# Patient Record
Sex: MALE | Race: BLACK OR AFRICAN AMERICAN | NOT HISPANIC OR LATINO | URBAN - METROPOLITAN AREA
[De-identification: names, ages, dates, MRNs, and addresses within clinical notes are randomized per-mention and may not be internally consistent; named-entity substitution may affect disease eponyms.]

---

## 2020-04-01 ENCOUNTER — EMERGENCY (EMERGENCY)
Facility: HOSPITAL | Age: 75
LOS: 1 days | Discharge: HOME | End: 2020-04-01
Attending: EMERGENCY MEDICINE | Admitting: EMERGENCY MEDICINE
Payer: COMMERCIAL

## 2020-04-01 VITALS
OXYGEN SATURATION: 96 % | HEART RATE: 109 BPM | DIASTOLIC BLOOD PRESSURE: 85 MMHG | SYSTOLIC BLOOD PRESSURE: 118 MMHG | TEMPERATURE: 98 F | RESPIRATION RATE: 16 BRPM

## 2020-04-01 VITALS
TEMPERATURE: 99 F | SYSTOLIC BLOOD PRESSURE: 116 MMHG | RESPIRATION RATE: 18 BRPM | DIASTOLIC BLOOD PRESSURE: 72 MMHG | OXYGEN SATURATION: 96 % | HEART RATE: 116 BPM

## 2020-04-01 PROCEDURE — 99284 EMERGENCY DEPT VISIT MOD MDM: CPT | Mod: 25

## 2020-04-01 PROCEDURE — 93010 ELECTROCARDIOGRAM REPORT: CPT

## 2020-04-01 NOTE — ED PROVIDER NOTE - NS ED ROS FT
Please see HPI section of chart for further detailed Review of Systems    chest pain  nausea  lightheadedness

## 2020-04-01 NOTE — ED PROVIDER NOTE - CLINICAL SUMMARY MEDICAL DECISION MAKING FREE TEXT BOX
75M, hx CAD/Stent, HLD presenting s/p episode midsternal stabbing chest pain w nausea, lightheadedness. Now feeling improved. EKG w/o stemi. Tachycardic. No infectious sx. Will obtain CXR, labs including trop. Currently stable, no acute distress. Will continue to follow up and re-assess. Case discussed with Attending.  Gunner Hilliard MD, PGY3 Emergency Medicine

## 2020-04-01 NOTE — ED ADULT TRIAGE NOTE - CHIEF COMPLAINT QUOTE
Pt st" I have chest pain and short of breath....started at 8pm. I have stents." Contact Daiana Alicia Wife [phone]

## 2020-04-01 NOTE — ED PROVIDER NOTE - OBJECTIVE STATEMENT
75M, hx CAD s/p stent (2010), HLD presents w chief complaint of chest pain. Patient reports an episode of midsternal stabbing chest pain around 9pm. Episode lasted ~5 minutes. Associated w nausea, lightheadedness. Pain was at rest, non exertional, non positional, non radiating. NO fevers or chills, vomiting, cough, shortness of breath, abdominal pain, leg swelling, calf pain, diarrhea, numbness or weakness to extremities, pain to back/arm/jaw. Patient reprots another brief episode prior to arrival to main ED. Currently resting w/o symptoms.   No allergies. No tobacco, ethanol, or drug use. Takes asa81 daily, took one this AM.   Cardiologist at [facility]

## 2020-04-01 NOTE — ED ADULT NURSE NOTE - CHIEF COMPLAINT QUOTE
Pt st" I have chest pain and short of breath....started at 8pm. I have stents." Contact Daiana Alicia Wife [phone]

## 2020-04-01 NOTE — ED ADULT NURSE NOTE - OBJECTIVE STATEMENT
Patient is a 75-year-old male A&OX4, ambulatory, came in complaining of chest pain. Patient with a history of a stent (2010) and HTN. Patient says the CP has subsided at this time. Patient placed on the cardiac monitor in hallway spot 22A. Patient's VSS. A 20 G placed in R AC. Labs drawn and sent. EKG done. Will continue to monitor.

## 2020-04-01 NOTE — ED PROVIDER NOTE - ATTENDING CONTRIBUTION TO CARE
75 year old with intemittent cp. none at this time. ekg unchanged. concern for acs vs pna vs msk pain vs pericarditis as pain is positional. trops ekg labs cxr reassess. likely dc

## 2020-04-01 NOTE — ED PROVIDER NOTE - PHYSICAL EXAMINATION
General: Well appearing, awake, alert, oriented, no acute distress. Resting in bed.  HEENT: PERRLA EOMI. No trauma/bruising noted to head or face. No rhinorrhea noted.   CV: tachy rate and reg rhythm, S1/S2, no murmurs/rubs/gallops noted on exam. No tenderness to palpation to chest wall.   Lungs: Clear to ascultation bilaterally, no wheezes/crackles/rales noted on exam. Equal chest wall excursion noted.   Abdomen: Soft, non tender, non distended, no guarding or rebound. No CVA tenderness to palpation.   MSK: Intact ROM of upper and lower extremities bilaterally. Intact ROM of neck. No gross deformities noted to extremities.   Neuro: Awake, A+O x4, moving all extremities spontaneously. CN 2-12 grossly intact. No nystagmus noted. Strength grossly intact to all extremities. Speech fluent, no slurred speech.   Extremities: No swelling or edema noted to extremities. No calf tenderness to palpation.   Skin: No rash onoted on exam.

## 2020-04-01 NOTE — ED PROVIDER NOTE - PATIENT PORTAL LINK FT
You can access the FollowMyHealth Patient Portal offered by [facility] by registering at the following website: [email or web address]. By joining [employer]’s FollowMyHealth portal, you will also be able to view your health information using other applications (apps) compatible with our system.

## 2020-04-01 NOTE — ED PROVIDER NOTE - NSFOLLOWUPINSTRUCTIONS_ED_ALL_ED_FT
Please follow up with your primary care physician for further care and evaluation.  If testing was performed in the Emergency Department, please bring copies of all test results to your doctor.  Please continue to take all home medications as previously prescribed.    PLEASE CALL YOUR CARDIOLOGIST TOMORROW TO DISCUSS FOLLOW UP CARE    Return to hospital for any new or concerning symptoms, including but not limited to: fevers, chills, nausea, vomiting, headache, dizziness, lightheadedness, worsening or recurrent chest pain, shortness of breath, difficulty breathing, abdominal pain, weakness, or any other new or concerning symptoms.

## 2020-04-01 NOTE — ED PROVIDER NOTE - PROGRESS NOTE DETAILS
Patient resting in bed, no acute distress, currently asymptomatic. Initial and rpt trop 10. EKG sinus w LBBB, no STEMI noted. Spoke w patient extensively regarding current differential. Given current pandemic state, unable to keep in CDU overnight for stresst test tomorrow. Spoke w patient regarding admit vs d/c home, and patient in agreement that d/c home is preferred at this time with close f/u. Will d/c home w instructions to f/u with his cardiologist within the week, and given strict return precautions. Patient in agreement w/ plan for d/c home and plan for follow up.   Gunner Hilliard MD, PGY3 Emergency Medicine Patient noted to be tachy to 105-108 at time of d/c, but with normal O2/BP. Patient denies any current sx. Extensive discussion w patient who is amenable for d/c and given very strict return precautions as well as screening info regarding sx of COVID and warning signs for return to ED  Gunner Hilliard MD, PGY3 Emergency Medicine

## 2020-04-02 LAB
ALBUMIN SERPL ELPH-MCNC: 3.6 G/DL — SIGNIFICANT CHANGE UP (ref 3.3–5)
ALP SERPL-CCNC: 59 U/L — SIGNIFICANT CHANGE UP (ref 40–120)
ALT FLD-CCNC: 35 U/L — SIGNIFICANT CHANGE UP (ref 4–41)
ANION GAP SERPL CALC-SCNC: 14 MMO/L — SIGNIFICANT CHANGE UP (ref 7–14)
APTT BLD: 30.3 SEC — SIGNIFICANT CHANGE UP (ref 27.5–36.3)
AST SERPL-CCNC: 47 U/L — HIGH (ref 4–40)
BILIRUB SERPL-MCNC: 0.5 MG/DL — SIGNIFICANT CHANGE UP (ref 0.2–1.2)
BUN SERPL-MCNC: 23 MG/DL — SIGNIFICANT CHANGE UP (ref 7–23)
CALCIUM SERPL-MCNC: 9 MG/DL — SIGNIFICANT CHANGE UP (ref 8.4–10.5)
CHLORIDE SERPL-SCNC: 94 MMOL/L — LOW (ref 98–107)
CO2 SERPL-SCNC: 21 MMOL/L — LOW (ref 22–31)
CREAT SERPL-MCNC: 1.59 MG/DL — HIGH (ref 0.5–1.3)
GLUCOSE SERPL-MCNC: 128 MG/DL — HIGH (ref 70–99)
HCT VFR BLD CALC: 38 % — LOW (ref 39–50)
HGB BLD-MCNC: 13.1 G/DL — SIGNIFICANT CHANGE UP (ref 13–17)
INR BLD: 1.19 — HIGH (ref 0.88–1.17)
MCHC RBC-ENTMCNC: 30.3 PG — SIGNIFICANT CHANGE UP (ref 27–34)
MCHC RBC-ENTMCNC: 34.5 % — SIGNIFICANT CHANGE UP (ref 32–36)
MCV RBC AUTO: 88 FL — SIGNIFICANT CHANGE UP (ref 80–100)
NRBC # FLD: 0 K/UL — SIGNIFICANT CHANGE UP (ref 0–0)
PLATELET # BLD AUTO: 202 K/UL — SIGNIFICANT CHANGE UP (ref 150–400)
PMV BLD: 10 FL — SIGNIFICANT CHANGE UP (ref 7–13)
POTASSIUM SERPL-MCNC: 4.1 MMOL/L — SIGNIFICANT CHANGE UP (ref 3.5–5.3)
POTASSIUM SERPL-SCNC: 4.1 MMOL/L — SIGNIFICANT CHANGE UP (ref 3.5–5.3)
PROT SERPL-MCNC: 7.7 G/DL — SIGNIFICANT CHANGE UP (ref 6–8.3)
PROTHROM AB SERPL-ACNC: 13.8 SEC — HIGH (ref 9.8–13.1)
RBC # BLD: 4.32 M/UL — SIGNIFICANT CHANGE UP (ref 4.2–5.8)
RBC # FLD: 14.4 % — SIGNIFICANT CHANGE UP (ref 10.3–14.5)
SODIUM SERPL-SCNC: 129 MMOL/L — LOW (ref 135–145)
TROPONIN T, HIGH SENSITIVITY: 10 NG/L — SIGNIFICANT CHANGE UP (ref ?–14)
TROPONIN T, HIGH SENSITIVITY: 10 NG/L — SIGNIFICANT CHANGE UP (ref ?–14)
WBC # BLD: 13.55 K/UL — HIGH (ref 3.8–10.5)
WBC # FLD AUTO: 13.55 K/UL — HIGH (ref 3.8–10.5)

## 2020-04-02 PROCEDURE — 71045 X-RAY EXAM CHEST 1 VIEW: CPT | Mod: 26

## 2022-09-18 ENCOUNTER — EMERGENCY (EMERGENCY)
Facility: HOSPITAL | Age: 77
LOS: 1 days | Discharge: HOME | End: 2022-09-18
Attending: EMERGENCY MEDICINE | Admitting: EMERGENCY MEDICINE
Payer: COMMERCIAL

## 2022-09-18 VITALS
RESPIRATION RATE: 15 BRPM | DIASTOLIC BLOOD PRESSURE: 69 MMHG | OXYGEN SATURATION: 100 % | SYSTOLIC BLOOD PRESSURE: 121 MMHG | HEART RATE: 66 BPM

## 2022-09-18 VITALS
DIASTOLIC BLOOD PRESSURE: 73 MMHG | TEMPERATURE: 98 F | HEIGHT: 71 IN | RESPIRATION RATE: 18 BRPM | SYSTOLIC BLOOD PRESSURE: 134 MMHG | HEART RATE: 81 BPM | OXYGEN SATURATION: 97 %

## 2022-09-18 LAB
ALBUMIN SERPL ELPH-MCNC: 4.1 G/DL — SIGNIFICANT CHANGE UP (ref 3.3–5)
ALP SERPL-CCNC: 95 U/L — SIGNIFICANT CHANGE UP (ref 40–120)
ALT FLD-CCNC: 14 U/L — SIGNIFICANT CHANGE UP (ref 4–41)
ANION GAP SERPL CALC-SCNC: 7 MMOL/L — SIGNIFICANT CHANGE UP (ref 7–14)
AST SERPL-CCNC: 20 U/L — SIGNIFICANT CHANGE UP (ref 4–40)
BASOPHILS # BLD AUTO: 0.03 K/UL — SIGNIFICANT CHANGE UP (ref 0–0.2)
BASOPHILS NFR BLD AUTO: 0.5 % — SIGNIFICANT CHANGE UP (ref 0–2)
BILIRUB SERPL-MCNC: 0.2 MG/DL — SIGNIFICANT CHANGE UP (ref 0.2–1.2)
BUN SERPL-MCNC: 22 MG/DL — SIGNIFICANT CHANGE UP (ref 7–23)
CALCIUM SERPL-MCNC: 9.1 MG/DL — SIGNIFICANT CHANGE UP (ref 8.4–10.5)
CHLORIDE SERPL-SCNC: 109 MMOL/L — HIGH (ref 98–107)
CO2 SERPL-SCNC: 25 MMOL/L — SIGNIFICANT CHANGE UP (ref 22–31)
CREAT SERPL-MCNC: 1.42 MG/DL — HIGH (ref 0.5–1.3)
EGFR: 52 ML/MIN/1.73M2 — LOW
EOSINOPHIL # BLD AUTO: 0.19 K/UL — SIGNIFICANT CHANGE UP (ref 0–0.5)
EOSINOPHIL NFR BLD AUTO: 3.3 % — SIGNIFICANT CHANGE UP (ref 0–6)
GLUCOSE SERPL-MCNC: 97 MG/DL — SIGNIFICANT CHANGE UP (ref 70–99)
HCT VFR BLD CALC: 36.9 % — LOW (ref 39–50)
HGB BLD-MCNC: 12.4 G/DL — LOW (ref 13–17)
IANC: 2.46 K/UL — SIGNIFICANT CHANGE UP (ref 1.8–7.4)
IMM GRANULOCYTES NFR BLD AUTO: 0.2 % — SIGNIFICANT CHANGE UP (ref 0–0.9)
LYMPHOCYTES # BLD AUTO: 2.29 K/UL — SIGNIFICANT CHANGE UP (ref 1–3.3)
LYMPHOCYTES # BLD AUTO: 39.6 % — SIGNIFICANT CHANGE UP (ref 13–44)
MCHC RBC-ENTMCNC: 31.1 PG — SIGNIFICANT CHANGE UP (ref 27–34)
MCHC RBC-ENTMCNC: 33.6 GM/DL — SIGNIFICANT CHANGE UP (ref 32–36)
MCV RBC AUTO: 92.5 FL — SIGNIFICANT CHANGE UP (ref 80–100)
MONOCYTES # BLD AUTO: 0.8 K/UL — SIGNIFICANT CHANGE UP (ref 0–0.9)
MONOCYTES NFR BLD AUTO: 13.8 % — SIGNIFICANT CHANGE UP (ref 2–14)
NEUTROPHILS # BLD AUTO: 2.46 K/UL — SIGNIFICANT CHANGE UP (ref 1.8–7.4)
NEUTROPHILS NFR BLD AUTO: 42.6 % — LOW (ref 43–77)
NRBC # BLD: 0 /100 WBCS — SIGNIFICANT CHANGE UP (ref 0–0)
NRBC # FLD: 0 K/UL — SIGNIFICANT CHANGE UP (ref 0–0)
PLATELET # BLD AUTO: 181 K/UL — SIGNIFICANT CHANGE UP (ref 150–400)
POTASSIUM SERPL-MCNC: 4.2 MMOL/L — SIGNIFICANT CHANGE UP (ref 3.5–5.3)
POTASSIUM SERPL-SCNC: 4.2 MMOL/L — SIGNIFICANT CHANGE UP (ref 3.5–5.3)
PROT SERPL-MCNC: 7.5 G/DL — SIGNIFICANT CHANGE UP (ref 6–8.3)
RBC # BLD: 3.99 M/UL — LOW (ref 4.2–5.8)
RBC # FLD: 14.4 % — SIGNIFICANT CHANGE UP (ref 10.3–14.5)
SODIUM SERPL-SCNC: 141 MMOL/L — SIGNIFICANT CHANGE UP (ref 135–145)
TROPONIN T, HIGH SENSITIVITY RESULT: <6 NG/L — SIGNIFICANT CHANGE UP
TROPONIN T, HIGH SENSITIVITY RESULT: <6 NG/L — SIGNIFICANT CHANGE UP
WBC # BLD: 5.78 K/UL — SIGNIFICANT CHANGE UP (ref 3.8–10.5)
WBC # FLD AUTO: 5.78 K/UL — SIGNIFICANT CHANGE UP (ref 3.8–10.5)

## 2022-09-18 PROCEDURE — 93010 ELECTROCARDIOGRAM REPORT: CPT

## 2022-09-18 PROCEDURE — 99285 EMERGENCY DEPT VISIT HI MDM: CPT | Mod: 25

## 2022-09-18 PROCEDURE — 71045 X-RAY EXAM CHEST 1 VIEW: CPT | Mod: 26

## 2022-09-18 RX ORDER — IBUPROFEN 200 MG
400 TABLET ORAL ONCE
Refills: 0 | Status: COMPLETED | OUTPATIENT
Start: 2022-09-18 | End: 2022-09-18

## 2022-09-18 RX ORDER — ACETAMINOPHEN 500 MG
975 TABLET ORAL ONCE
Refills: 0 | Status: COMPLETED | OUTPATIENT
Start: 2022-09-18 | End: 2022-09-18

## 2022-09-18 RX ORDER — ASPIRIN/CALCIUM CARB/MAGNESIUM 324 MG
324 TABLET ORAL ONCE
Refills: 0 | Status: COMPLETED | OUTPATIENT
Start: 2022-09-18 | End: 2022-09-18

## 2022-09-18 RX ADMIN — Medication 324 MILLIGRAM(S): at 19:49

## 2022-09-18 RX ADMIN — Medication 975 MILLIGRAM(S): at 18:29

## 2022-09-18 RX ADMIN — Medication 400 MILLIGRAM(S): at 18:29

## 2022-09-18 NOTE — ED PROVIDER NOTE - NSICDXPASTMEDICALHX_GEN_ALL_CORE_FT
PAST MEDICAL HISTORY:  CAD (coronary artery disease) one cardiac stent x 1 2010    Hypertension

## 2022-09-18 NOTE — ED PROVIDER NOTE - PATIENT PORTAL LINK FT
You can access the FollowMyHealth Patient Portal offered by [facility] by registering at the following website: [email or web address]. By joining [employer]’s FollowMyHealth portal, you will also be able to view your health information using other applications (apps) compatible with our system.

## 2022-09-18 NOTE — ED PROVIDER NOTE - OBJECTIVE STATEMENT
77M w/ h/o CAD s/p stent x1, HTN, HLD, who presents with 2 hours of L chest pain. Patient was lifting heavy grocery bags earlier today, Left chest pain began 1 hour afterwards, non-radiating, sharp, exacerbated by moving left arm. Denies SOB, fever/chills, abd pain, N/V.

## 2022-09-18 NOTE — ED PROVIDER NOTE - ATTENDING CONTRIBUTION TO CARE
I performed a face to face history and physical exam of the patient and discussed their management with the resident. I reviewed the resident's note and agree with the documented findings and plan of care.     ANTON: 76 y/o male with hx of CAD s/p stent in 2010, HTN, HLD, p/w substernal chest pain started 4 pm today, 1 hour after lifting something heavy, episode lasting 15 minutes x 2, chest pain free here, no SOB, no N/V, no diaphoresis, no radiation of pain, no leg pain, no leg swelling, on exam no chest wall tenderness, pain when he lifts arm up, abdomen soft NT/ND, ext no C/C/E, will tele, cxr, ekg, cbc, cmp, ce, reevaluate

## 2022-09-18 NOTE — ED PROVIDER NOTE - PROGRESS NOTE DETAILS
JPATEL: pt reassessed, resting comfortably in stretcher, no episodes of CP while in ED [facility] PGY-3: Patient reassessed, resting comfortably, denies having any chest pain. Offered CDU stay for further cardiac work up but pt prefers to go home, will f/u with cardiologist Dr. Martell tomorrow AM. Return precautions given. Will dc

## 2022-09-18 NOTE — ED PROVIDER NOTE - PHYSICAL EXAMINATION
GENERAL: well appearing in no acute distress, non-toxic appearing  CARDIAC: regular rate and rhythm, normal S1S2, no appreciable murmurs, 2+ pulses in UE/LE b/l  PULM: normal breath sounds, clear to ascultation bilaterally, no rales, rhonchi, wheezing  NEURO: normal speech, AAOx3  MSK: ROM intact, + LSB and L pec tenderness   SKIN: well-perfused, extremities warm, no visible rashes  PSYCH: appropriate mood and affect

## 2022-09-18 NOTE — ED PROVIDER NOTE - CLINICAL SUMMARY MEDICAL DECISION MAKING FREE TEXT BOX
77M w/ h/o CAD s/p stent x1, HTN, HLD, who presents with 2 hours of L chest pain. Patient was lifting heavy grocery bags earlier today, Left chest pain began 1 hour afterwards, non-radiating, sharp, exacerbated by moving left arm. Denies SOB, fever/chills, abd pain, N/V. AVSS, RRR, no murmurs, lungs CTA b/l, + L ant chest wall tenderness. MSK vs less likely fracture vs less likely ACS. Will get labs, ECG, CXR, give meds and reassess.

## 2022-09-18 NOTE — ED ADULT NURSE NOTE - OBJECTIVE STATEMENT
Break coverage RN- Received pt in room 7. pt A&OX3, ambulatory, family at bedside. pt with hx of 1 stent in 2010. pt c/o midsternal "sharp" chest pain beginning after lifting heavy shopping bags today. pt appears to be in no distress. placed on cardiac monitor. respirations are equal and nonlabored, no respiratory distress noted, sating 100% on room air. denies any pain at this time, no sob, cough, fever/chills, headache, dizziness, n/v. 20 gauge iv placed in the left ac, labs sent. pt medicated as per orders. safety maintained, side rails up. awaiting further plan will reassess & monitor.

## 2022-09-18 NOTE — ED ADULT TRIAGE NOTE - CHIEF COMPLAINT QUOTE
Pt AOX4 c/o chest pain, sharp and midsternal,  started after a bout of lifting heavy shopping bags, denies SOB, pt has Hx of 1 stent placed 2010; was on Plavix for 10 years - was discontinued in 2020

## 2022-09-18 NOTE — ED ADULT NURSE NOTE - NSIMPLEMENTINTERV_GEN_ALL_ED
Implemented All Universal Safety Interventions:  [place] to call system. Call bell, personal items and telephone within reach. Instruct patient to call for assistance. Room bathroom lighting operational. Non-slip footwear when patient is off stretcher. Physically safe environment: no spills, clutter or unnecessary equipment. Stretcher in lowest position, wheels locked, appropriate side rails in place.

## 2023-10-16 NOTE — ED ADULT NURSE NOTE - NS ED NURSE RECORD ANOTHER VITAL SIGN
I called [facility] in response to a referral that was received for patient to establish care with Thoracic Surgery. Outreach was made to complete patient's intake questionnaire . Patient's intake questionnaire was reviewed and complete. Patient's intake has been sent to the team for clinical review.
Yes

## 2023-11-01 ENCOUNTER — EMERGENCY (EMERGENCY)
Facility: HOSPITAL | Age: 78
LOS: 1 days | Discharge: HOME | End: 2023-11-01
Attending: STUDENT IN AN ORGANIZED HEALTH CARE EDUCATION/TRAINING PROGRAM | Admitting: STUDENT IN AN ORGANIZED HEALTH CARE EDUCATION/TRAINING PROGRAM
Payer: COMMERCIAL

## 2023-11-01 VITALS
TEMPERATURE: 98 F | DIASTOLIC BLOOD PRESSURE: 86 MMHG | RESPIRATION RATE: 16 BRPM | OXYGEN SATURATION: 100 % | HEART RATE: 62 BPM | SYSTOLIC BLOOD PRESSURE: 153 MMHG

## 2023-11-01 LAB
ALBUMIN SERPL ELPH-MCNC: 3.7 G/DL — SIGNIFICANT CHANGE UP (ref 3.3–5)
ALBUMIN SERPL ELPH-MCNC: 3.7 G/DL — SIGNIFICANT CHANGE UP (ref 3.3–5)
ALP SERPL-CCNC: 83 U/L — SIGNIFICANT CHANGE UP (ref 40–120)
ALP SERPL-CCNC: 83 U/L — SIGNIFICANT CHANGE UP (ref 40–120)
ALT FLD-CCNC: 16 U/L — SIGNIFICANT CHANGE UP (ref 4–41)
ALT FLD-CCNC: 16 U/L — SIGNIFICANT CHANGE UP (ref 4–41)
ANION GAP SERPL CALC-SCNC: 9 MMOL/L — SIGNIFICANT CHANGE UP (ref 7–14)
ANION GAP SERPL CALC-SCNC: 9 MMOL/L — SIGNIFICANT CHANGE UP (ref 7–14)
APPEARANCE UR: ABNORMAL
APPEARANCE UR: ABNORMAL
AST SERPL-CCNC: 20 U/L — SIGNIFICANT CHANGE UP (ref 4–40)
AST SERPL-CCNC: 20 U/L — SIGNIFICANT CHANGE UP (ref 4–40)
BACTERIA # UR AUTO: NEGATIVE /HPF — SIGNIFICANT CHANGE UP
BACTERIA # UR AUTO: NEGATIVE /HPF — SIGNIFICANT CHANGE UP
BASOPHILS # BLD AUTO: 0.03 K/UL — SIGNIFICANT CHANGE UP (ref 0–0.2)
BASOPHILS # BLD AUTO: 0.03 K/UL — SIGNIFICANT CHANGE UP (ref 0–0.2)
BASOPHILS NFR BLD AUTO: 0.6 % — SIGNIFICANT CHANGE UP (ref 0–2)
BASOPHILS NFR BLD AUTO: 0.6 % — SIGNIFICANT CHANGE UP (ref 0–2)
BILIRUB SERPL-MCNC: 0.3 MG/DL — SIGNIFICANT CHANGE UP (ref 0.2–1.2)
BILIRUB SERPL-MCNC: 0.3 MG/DL — SIGNIFICANT CHANGE UP (ref 0.2–1.2)
BILIRUB UR-MCNC: NEGATIVE — SIGNIFICANT CHANGE UP
BILIRUB UR-MCNC: NEGATIVE — SIGNIFICANT CHANGE UP
BUN SERPL-MCNC: 20 MG/DL — SIGNIFICANT CHANGE UP (ref 7–23)
BUN SERPL-MCNC: 20 MG/DL — SIGNIFICANT CHANGE UP (ref 7–23)
CALCIUM SERPL-MCNC: 8.4 MG/DL — SIGNIFICANT CHANGE UP (ref 8.4–10.5)
CALCIUM SERPL-MCNC: 8.4 MG/DL — SIGNIFICANT CHANGE UP (ref 8.4–10.5)
CAST: 0 /LPF — SIGNIFICANT CHANGE UP (ref 0–4)
CAST: 0 /LPF — SIGNIFICANT CHANGE UP (ref 0–4)
CHLORIDE SERPL-SCNC: 108 MMOL/L — HIGH (ref 98–107)
CHLORIDE SERPL-SCNC: 108 MMOL/L — HIGH (ref 98–107)
CO2 SERPL-SCNC: 24 MMOL/L — SIGNIFICANT CHANGE UP (ref 22–31)
CO2 SERPL-SCNC: 24 MMOL/L — SIGNIFICANT CHANGE UP (ref 22–31)
COLOR SPEC: ABNORMAL
COLOR SPEC: ABNORMAL
CREAT SERPL-MCNC: 1.28 MG/DL — SIGNIFICANT CHANGE UP (ref 0.5–1.3)
CREAT SERPL-MCNC: 1.28 MG/DL — SIGNIFICANT CHANGE UP (ref 0.5–1.3)
DIFF PNL FLD: ABNORMAL
DIFF PNL FLD: ABNORMAL
EGFR: 58 ML/MIN/1.73M2 — LOW
EGFR: 58 ML/MIN/1.73M2 — LOW
EOSINOPHIL # BLD AUTO: 0.24 K/UL — SIGNIFICANT CHANGE UP (ref 0–0.5)
EOSINOPHIL # BLD AUTO: 0.24 K/UL — SIGNIFICANT CHANGE UP (ref 0–0.5)
EOSINOPHIL NFR BLD AUTO: 4.4 % — SIGNIFICANT CHANGE UP (ref 0–6)
EOSINOPHIL NFR BLD AUTO: 4.4 % — SIGNIFICANT CHANGE UP (ref 0–6)
GLUCOSE SERPL-MCNC: 94 MG/DL — SIGNIFICANT CHANGE UP (ref 70–99)
GLUCOSE SERPL-MCNC: 94 MG/DL — SIGNIFICANT CHANGE UP (ref 70–99)
GLUCOSE UR QL: NEGATIVE MG/DL — SIGNIFICANT CHANGE UP
GLUCOSE UR QL: NEGATIVE MG/DL — SIGNIFICANT CHANGE UP
HCT VFR BLD CALC: 33.1 % — LOW (ref 39–50)
HCT VFR BLD CALC: 33.1 % — LOW (ref 39–50)
HGB BLD-MCNC: 11.1 G/DL — LOW (ref 13–17)
HGB BLD-MCNC: 11.1 G/DL — LOW (ref 13–17)
IANC: 1.66 K/UL — LOW (ref 1.8–7.4)
IANC: 1.66 K/UL — LOW (ref 1.8–7.4)
IMM GRANULOCYTES NFR BLD AUTO: 0.2 % — SIGNIFICANT CHANGE UP (ref 0–0.9)
IMM GRANULOCYTES NFR BLD AUTO: 0.2 % — SIGNIFICANT CHANGE UP (ref 0–0.9)
KETONES UR-MCNC: NEGATIVE MG/DL — SIGNIFICANT CHANGE UP
KETONES UR-MCNC: NEGATIVE MG/DL — SIGNIFICANT CHANGE UP
LEUKOCYTE ESTERASE UR-ACNC: ABNORMAL
LEUKOCYTE ESTERASE UR-ACNC: ABNORMAL
LIDOCAIN IGE QN: 48 U/L — SIGNIFICANT CHANGE UP (ref 7–60)
LIDOCAIN IGE QN: 48 U/L — SIGNIFICANT CHANGE UP (ref 7–60)
LYMPHOCYTES # BLD AUTO: 2.7 K/UL — SIGNIFICANT CHANGE UP (ref 1–3.3)
LYMPHOCYTES # BLD AUTO: 2.7 K/UL — SIGNIFICANT CHANGE UP (ref 1–3.3)
LYMPHOCYTES # BLD AUTO: 50 % — HIGH (ref 13–44)
LYMPHOCYTES # BLD AUTO: 50 % — HIGH (ref 13–44)
MCHC RBC-ENTMCNC: 30.7 PG — SIGNIFICANT CHANGE UP (ref 27–34)
MCHC RBC-ENTMCNC: 30.7 PG — SIGNIFICANT CHANGE UP (ref 27–34)
MCHC RBC-ENTMCNC: 33.5 GM/DL — SIGNIFICANT CHANGE UP (ref 32–36)
MCHC RBC-ENTMCNC: 33.5 GM/DL — SIGNIFICANT CHANGE UP (ref 32–36)
MCV RBC AUTO: 91.7 FL — SIGNIFICANT CHANGE UP (ref 80–100)
MCV RBC AUTO: 91.7 FL — SIGNIFICANT CHANGE UP (ref 80–100)
MONOCYTES # BLD AUTO: 0.76 K/UL — SIGNIFICANT CHANGE UP (ref 0–0.9)
MONOCYTES # BLD AUTO: 0.76 K/UL — SIGNIFICANT CHANGE UP (ref 0–0.9)
MONOCYTES NFR BLD AUTO: 14.1 % — HIGH (ref 2–14)
MONOCYTES NFR BLD AUTO: 14.1 % — HIGH (ref 2–14)
NEUTROPHILS # BLD AUTO: 1.66 K/UL — LOW (ref 1.8–7.4)
NEUTROPHILS # BLD AUTO: 1.66 K/UL — LOW (ref 1.8–7.4)
NEUTROPHILS NFR BLD AUTO: 30.7 % — LOW (ref 43–77)
NEUTROPHILS NFR BLD AUTO: 30.7 % — LOW (ref 43–77)
NITRITE UR-MCNC: NEGATIVE — SIGNIFICANT CHANGE UP
NITRITE UR-MCNC: NEGATIVE — SIGNIFICANT CHANGE UP
NRBC # BLD: 0 /100 WBCS — SIGNIFICANT CHANGE UP (ref 0–0)
NRBC # BLD: 0 /100 WBCS — SIGNIFICANT CHANGE UP (ref 0–0)
NRBC # FLD: 0 K/UL — SIGNIFICANT CHANGE UP (ref 0–0)
NRBC # FLD: 0 K/UL — SIGNIFICANT CHANGE UP (ref 0–0)
PH UR: 6.5 — SIGNIFICANT CHANGE UP (ref 5–8)
PH UR: 6.5 — SIGNIFICANT CHANGE UP (ref 5–8)
PLATELET # BLD AUTO: 162 K/UL — SIGNIFICANT CHANGE UP (ref 150–400)
PLATELET # BLD AUTO: 162 K/UL — SIGNIFICANT CHANGE UP (ref 150–400)
POTASSIUM SERPL-MCNC: 4.2 MMOL/L — SIGNIFICANT CHANGE UP (ref 3.5–5.3)
POTASSIUM SERPL-MCNC: 4.2 MMOL/L — SIGNIFICANT CHANGE UP (ref 3.5–5.3)
POTASSIUM SERPL-SCNC: 4.2 MMOL/L — SIGNIFICANT CHANGE UP (ref 3.5–5.3)
POTASSIUM SERPL-SCNC: 4.2 MMOL/L — SIGNIFICANT CHANGE UP (ref 3.5–5.3)
PROT SERPL-MCNC: 6.7 G/DL — SIGNIFICANT CHANGE UP (ref 6–8.3)
PROT SERPL-MCNC: 6.7 G/DL — SIGNIFICANT CHANGE UP (ref 6–8.3)
PROT UR-MCNC: SIGNIFICANT CHANGE UP MG/DL
PROT UR-MCNC: SIGNIFICANT CHANGE UP MG/DL
RBC # BLD: 3.61 M/UL — LOW (ref 4.2–5.8)
RBC # BLD: 3.61 M/UL — LOW (ref 4.2–5.8)
RBC # FLD: 14.8 % — HIGH (ref 10.3–14.5)
RBC # FLD: 14.8 % — HIGH (ref 10.3–14.5)
RBC CASTS # UR COMP ASSIST: 820 /HPF — HIGH (ref 0–4)
RBC CASTS # UR COMP ASSIST: 820 /HPF — HIGH (ref 0–4)
SODIUM SERPL-SCNC: 141 MMOL/L — SIGNIFICANT CHANGE UP (ref 135–145)
SODIUM SERPL-SCNC: 141 MMOL/L — SIGNIFICANT CHANGE UP (ref 135–145)
SP GR SPEC: 1.02 — SIGNIFICANT CHANGE UP (ref 1–1.03)
SP GR SPEC: 1.02 — SIGNIFICANT CHANGE UP (ref 1–1.03)
SQUAMOUS # UR AUTO: 0 /HPF — SIGNIFICANT CHANGE UP (ref 0–5)
SQUAMOUS # UR AUTO: 0 /HPF — SIGNIFICANT CHANGE UP (ref 0–5)
UROBILINOGEN FLD QL: 0.2 MG/DL — SIGNIFICANT CHANGE UP (ref 0.2–1)
UROBILINOGEN FLD QL: 0.2 MG/DL — SIGNIFICANT CHANGE UP (ref 0.2–1)
WBC # BLD: 5.4 K/UL — SIGNIFICANT CHANGE UP (ref 3.8–10.5)
WBC # BLD: 5.4 K/UL — SIGNIFICANT CHANGE UP (ref 3.8–10.5)
WBC # FLD AUTO: 5.4 K/UL — SIGNIFICANT CHANGE UP (ref 3.8–10.5)
WBC # FLD AUTO: 5.4 K/UL — SIGNIFICANT CHANGE UP (ref 3.8–10.5)
WBC UR QL: 2 /HPF — SIGNIFICANT CHANGE UP (ref 0–5)
WBC UR QL: 2 /HPF — SIGNIFICANT CHANGE UP (ref 0–5)

## 2023-11-01 PROCEDURE — 74177 CT ABD & PELVIS W/CONTRAST: CPT | Mod: 26,MA

## 2023-11-01 PROCEDURE — 99285 EMERGENCY DEPT VISIT HI MDM: CPT

## 2023-11-01 RX ORDER — SODIUM CHLORIDE 9 MG/ML
1000 INJECTION, SOLUTION INTRAVENOUS ONCE
Refills: 0 | Status: COMPLETED | OUTPATIENT
Start: 2023-11-01 | End: 2023-11-01

## 2023-11-01 RX ORDER — CEFTRIAXONE 500 MG/1
1000 INJECTION, POWDER, FOR SOLUTION INTRAMUSCULAR; INTRAVENOUS ONCE
Refills: 0 | Status: COMPLETED | OUTPATIENT
Start: 2023-11-01 | End: 2023-11-01

## 2023-11-01 RX ORDER — CEFDINIR 250 MG/5ML
1 POWDER, FOR SUSPENSION ORAL
Qty: 14 | Refills: 0
Start: 2023-11-01 | End: 2023-11-07

## 2023-11-01 RX ADMIN — SODIUM CHLORIDE 1000 MILLILITER(S): 9 INJECTION, SOLUTION INTRAVENOUS at 03:58

## 2023-11-01 RX ADMIN — CEFTRIAXONE 100 MILLIGRAM(S): 500 INJECTION, POWDER, FOR SOLUTION INTRAMUSCULAR; INTRAVENOUS at 05:50

## 2023-11-01 NOTE — ED ADULT TRIAGE NOTE - CHIEF COMPLAINT QUOTE
Pt c/o hematuria x 2 days. Endorse left flank pain, passing clots smaller than  a quarter, urinary frequency. Denies nausea, vomiting , fever, chills, blood thinner. Past Medical History: HTN, Cardiac Stent x 1,

## 2023-11-01 NOTE — ED PROVIDER NOTE - CLINICAL SUMMARY MEDICAL DECISION MAKING FREE TEXT BOX
78 year old male with PMH significant for BPH, CAD with 1 stent placed 10+ years ago, and HTN who is presenting to the ED due to hematuria. Differential includes nephrolithiasis vs malignancy. Will order CBC, CMP, U/A. Will get CT of Abdomin and Pelvis with IV contrast to r/o malignancy.

## 2023-11-01 NOTE — ED PROVIDER NOTE - NSICDXPASTMEDICALHX_GEN_ALL_CORE_FT
PAST MEDICAL HISTORY:  CAD (coronary artery disease) one cardiac stent x 1 2010    History of BPH     Hypertension

## 2023-11-01 NOTE — ED PROVIDER NOTE - PHYSICAL EXAMINATION
General: NAD, well groomed, well developed   Head: atraumatic, normocephalic   Eyes: pupils equal, round, reactive to light, anicteric sclera   ENMT: moist mucous membranes   Neck: supple, no JVD, no tender lymphadenopathy   Lungs: clear lung fields bilaterally, no wheezes, rales or rhonchi, no accessory muscle use for respirations   Heart: regular rate and rhythm General: NAD, well groomed, well developed   Head: atraumatic, normocephalic   Eyes: pupils equal, round, reactive to light, anicteric sclera   ENMT: moist mucous membranes   Neck: supple, no JVD, no tender lymphadenopathy   Lungs: clear lung fields bilaterally, no wheezes, rales or rhonchi, no accessory muscle use for respirations   Heart: regular rate and rhythm, normal s1,s2, no murmurs, rubs, or gallops   Abdomen: soft, non-distended, minimal lower abdominal tenderness to palpation, no rebound tenderness. no involuntary guarding   Back: no CVA tenderness  Extremities: warm, well perfused, no lower extremity edema   Neuro: AOx3, no focal neurological deficits   Psych: appropriate affect

## 2023-11-01 NOTE — ED PROVIDER NOTE - ATTENDING CONTRIBUTION TO CARE
Dr. Millard, Attending Physician-  I performed a face to face bedside interview with patient regarding history of present illness, review of symptoms and past medical history. I completed an independent physical exam.  I have discussed patient's plan of care with the resident.    78M, BPH, CAD, HTN, who presents with hematuria. For the past 2 days, reports intermittent hematuria with clots. Has never happened before. No blood thinners. No hx of kidney stones. Stooling/voiding without any issues. On ROS, denies headaches, fevers, chills, cough, sputum, cp, sob, abdominal pain, nvd, recent travel, trauma, syncope, black/bloody stools. Physical: Afebrile, well appearing, neck supple, no rash, rrr, ctabl, abdomen soft and ndnt, no le edema, stable gait. No testicular/penile ttp or skin changes. No CVAT. Plan: labs, urine, CTAP - kidney stones, malignancy?

## 2023-11-01 NOTE — ED ADULT NURSE NOTE - NSFALLUNIVINTERV_ED_ALL_ED
Bed/Stretcher in lowest position, wheels locked, appropriate side rails in place/Call bell, personal items and telephone in reach/Instruct patient to call for assistance before getting out of bed/chair/stretcher/Non-slip footwear applied when patient is off stretcher/[place] to call system/Physically safe environment - no spills, clutter or unnecessary equipment/Purposeful proactive rounding/Room/bathroom lighting operational, light cord in reach

## 2023-11-01 NOTE — ED PROVIDER NOTE - OBJECTIVE STATEMENT
78 year old male with PMH significant for BPH, CAD with 1 stent placed 10+ years ago, and HTN who is presenting to the ED due to hematuria. Patient first noticed blood in his urine starting 2 days ago. Patient states that occasionally there will be small clots, but he notices blood every time he urinates. Aside from the hematuria, patient has been having mild left flank pain and lower abdominal pain. Patient denies any difficulty with urination. Patient denies any fever, chills, chest pain, shortness of breath, light-headedness, nausea, vomiting, constipation, and diarrhea.

## 2023-11-01 NOTE — ED ADULT TRIAGE NOTE - TEMPERATURE IN FAHRENHEIT (DEGREES F)
Name And Contact Information For Health Care Proxy: Mica Liu [phone] Name And Contact Information For Health Care Proxy: Mica Liu [phone]

## 2023-11-01 NOTE — ED PROVIDER NOTE - CARE PROVIDER_API CALL
JANET MATHEWS  [address] [place] [address]  Phone: [phone]  Fax: [phone]  Established Patient  Follow Up Time: 1-3 Days

## 2023-11-01 NOTE — ED PROVIDER NOTE - PATIENT PORTAL LINK FT
You can access the FollowMyHealth Patient Portal offered by [facility] by registering at the following website: [email or web address]. By joining [employer]’s FollowMyHealth portal, you will also be able to view your health information using other applications (apps) compatible with our system.

## 2023-11-01 NOTE — ED ADULT NURSE NOTE - NSFALLRISKASMT_ED_ALL_ED_DT
01-Nov-2023 04:00 Dapsone Pregnancy And Lactation Text: This medication is Pregnancy Category C and is not considered safe during pregnancy or breast feeding.

## 2023-11-01 NOTE — ED ADULT NURSE NOTE - OBJECTIVE STATEMENT
Patient received to 26a, A/Ox4, ambulatory, c/o hematuria and urinary frequency x 2 days. Endorsing left-sided flank pain. Denies fevers, chills, N/V and blood thinner use. 18G IV placed to right AC. Safety maintained, awaiting CT.

## 2023-11-01 NOTE — ED ADULT NURSE NOTE - NS_SISCREENINGSR_GEN_ALL_ED
Negative Mauc Instructions: By selecting yes to the question below the MAUC number will be added into the note.  This will be calculated automatically based on the diagnosis chosen, the size entered, the body zone selected (H,M,L) and the specific indications you chose. You will also have the option to override the Mohs AUC if you disagree with the automatically calculated number and this option is found in the Case Summary tab.

## 2023-11-01 NOTE — ED PROVIDER NOTE - NSFOLLOWUPINSTRUCTIONS_ED_ALL_ED_FT
Follow these instructions at home:  Medicines    Take over-the-counter and prescription medicines only as told by your health care provider.  If you were prescribed an antibiotic medicine, take it as told by your health care provider. Do not stop taking the antibiotic even if you start to feel better.  Eating and drinking    Drink enough fluid to keep your urine pale yellow. It is recommended that you drink 3–4 quarts (2.8–3.8 L) a day. If you have been diagnosed with an infection, drinking cranberry juice in addition to large amounts of water is recommended.  Avoid caffeine, tea, and carbonated beverages. These tend to irritate the bladder.  Avoid alcohol because it may irritate the prostate (in males).  General instructions    If you have been diagnosed with a kidney stone, follow your health care provider's instructions about straining your urine to catch the stone.  Empty your bladder often. Avoid holding urine for long periods of time.  If you are female:  After a bowel movement, wipe from front to back and use each piece of toilet paper only once.  Empty your bladder before and after sex.  Pay attention to any changes in your symptoms. Tell your health care provider about any changes or any new symptoms.  It is up to you to get the results of any tests. Ask your health care provider, or the department that is doing the test, when your results will be ready.  Keep all follow-up visits. This is important.  Contact a health care provider if:  You develop back pain.  You have a fever or chills.  You have nausea or vomiting.  Your symptoms do not improve after 3 days.  Your symptoms get worse.  Get help right away if:  You develop severe vomiting and are unable to take medicine without vomiting.  You develop severe pain in your back or abdomen even though you are taking medicine.  You pass a large amount of blood in your urine.  You pass blood clots in your urine.  You feel very weak or like you might faint.  You faint.

## 2023-11-02 LAB
CULTURE RESULTS: SIGNIFICANT CHANGE UP
CULTURE RESULTS: SIGNIFICANT CHANGE UP
SPECIMEN SOURCE: SIGNIFICANT CHANGE UP
SPECIMEN SOURCE: SIGNIFICANT CHANGE UP

## 2025-02-10 ENCOUNTER — EMERGENCY (EMERGENCY)
Facility: HOSPITAL | Age: 80
LOS: 1 days | Discharge: HOME | End: 2025-02-10
Attending: STUDENT IN AN ORGANIZED HEALTH CARE EDUCATION/TRAINING PROGRAM | Admitting: STUDENT IN AN ORGANIZED HEALTH CARE EDUCATION/TRAINING PROGRAM
Payer: COMMERCIAL

## 2025-02-10 VITALS
SYSTOLIC BLOOD PRESSURE: 140 MMHG | OXYGEN SATURATION: 96 % | TEMPERATURE: 98 F | DIASTOLIC BLOOD PRESSURE: 88 MMHG | RESPIRATION RATE: 18 BRPM | HEART RATE: 74 BPM

## 2025-02-10 VITALS
HEART RATE: 80 BPM | WEIGHT: 188.94 LBS | SYSTOLIC BLOOD PRESSURE: 161 MMHG | DIASTOLIC BLOOD PRESSURE: 89 MMHG | HEIGHT: 71 IN | TEMPERATURE: 98 F | OXYGEN SATURATION: 97 % | RESPIRATION RATE: 16 BRPM

## 2025-02-10 PROBLEM — Z87.438 PERSONAL HISTORY OF OTHER DISEASES OF MALE GENITAL ORGANS: Chronic | Status: ACTIVE | Noted: 2023-11-01

## 2025-02-10 LAB
ALBUMIN SERPL ELPH-MCNC: 3.9 G/DL — SIGNIFICANT CHANGE UP (ref 3.3–5)
ALP SERPL-CCNC: 90 U/L — SIGNIFICANT CHANGE UP (ref 40–120)
ALT FLD-CCNC: 11 U/L — SIGNIFICANT CHANGE UP (ref 4–41)
ANION GAP SERPL CALC-SCNC: 10 MMOL/L — SIGNIFICANT CHANGE UP (ref 7–14)
APPEARANCE UR: CLEAR — SIGNIFICANT CHANGE UP
AST SERPL-CCNC: 16 U/L — SIGNIFICANT CHANGE UP (ref 4–40)
BASOPHILS # BLD AUTO: 0.05 K/UL — SIGNIFICANT CHANGE UP (ref 0–0.2)
BASOPHILS NFR BLD AUTO: 0.8 % — SIGNIFICANT CHANGE UP (ref 0–2)
BILIRUB SERPL-MCNC: 0.4 MG/DL — SIGNIFICANT CHANGE UP (ref 0.2–1.2)
BILIRUB UR-MCNC: NEGATIVE — SIGNIFICANT CHANGE UP
BLOOD GAS VENOUS COMPREHENSIVE RESULT: SIGNIFICANT CHANGE UP
BUN SERPL-MCNC: 18 MG/DL — SIGNIFICANT CHANGE UP (ref 7–23)
CALCIUM SERPL-MCNC: 8.9 MG/DL — SIGNIFICANT CHANGE UP (ref 8.4–10.5)
CHLORIDE SERPL-SCNC: 105 MMOL/L — SIGNIFICANT CHANGE UP (ref 98–107)
CO2 SERPL-SCNC: 23 MMOL/L — SIGNIFICANT CHANGE UP (ref 22–31)
COLOR SPEC: YELLOW — SIGNIFICANT CHANGE UP
CREAT SERPL-MCNC: 1.31 MG/DL — HIGH (ref 0.5–1.3)
DIFF PNL FLD: NEGATIVE — SIGNIFICANT CHANGE UP
EGFR: 56 ML/MIN/1.73M2 — LOW
EOSINOPHIL # BLD AUTO: 0.14 K/UL — SIGNIFICANT CHANGE UP (ref 0–0.5)
EOSINOPHIL NFR BLD AUTO: 2.1 % — SIGNIFICANT CHANGE UP (ref 0–6)
GLUCOSE SERPL-MCNC: 110 MG/DL — HIGH (ref 70–99)
GLUCOSE UR QL: NEGATIVE MG/DL — SIGNIFICANT CHANGE UP
HCT VFR BLD CALC: 36.8 % — LOW (ref 39–50)
HGB BLD-MCNC: 12.6 G/DL — LOW (ref 13–17)
IANC: 4.37 K/UL — SIGNIFICANT CHANGE UP (ref 1.8–7.4)
IMM GRANULOCYTES NFR BLD AUTO: 0.3 % — SIGNIFICANT CHANGE UP (ref 0–0.9)
KETONES UR-MCNC: ABNORMAL MG/DL
LEUKOCYTE ESTERASE UR-ACNC: NEGATIVE — SIGNIFICANT CHANGE UP
LIDOCAIN IGE QN: 50 U/L — SIGNIFICANT CHANGE UP (ref 7–60)
LYMPHOCYTES # BLD AUTO: 0.92 K/UL — LOW (ref 1–3.3)
LYMPHOCYTES # BLD AUTO: 14.1 % — SIGNIFICANT CHANGE UP (ref 13–44)
MCHC RBC-ENTMCNC: 31 PG — SIGNIFICANT CHANGE UP (ref 27–34)
MCHC RBC-ENTMCNC: 34.2 G/DL — SIGNIFICANT CHANGE UP (ref 32–36)
MCV RBC AUTO: 90.4 FL — SIGNIFICANT CHANGE UP (ref 80–100)
MONOCYTES # BLD AUTO: 1.02 K/UL — HIGH (ref 0–0.9)
MONOCYTES NFR BLD AUTO: 15.6 % — HIGH (ref 2–14)
NEUTROPHILS # BLD AUTO: 4.37 K/UL — SIGNIFICANT CHANGE UP (ref 1.8–7.4)
NEUTROPHILS NFR BLD AUTO: 67.1 % — SIGNIFICANT CHANGE UP (ref 43–77)
NITRITE UR-MCNC: NEGATIVE — SIGNIFICANT CHANGE UP
NRBC # BLD AUTO: 0 K/UL — SIGNIFICANT CHANGE UP (ref 0–0)
NRBC # BLD: 0 /100 WBCS — SIGNIFICANT CHANGE UP (ref 0–0)
NRBC # FLD: 0 K/UL — SIGNIFICANT CHANGE UP (ref 0–0)
NRBC BLD-RTO: 0 /100 WBCS — SIGNIFICANT CHANGE UP (ref 0–0)
PH UR: 6 — SIGNIFICANT CHANGE UP (ref 5–8)
PLATELET # BLD AUTO: 151 K/UL — SIGNIFICANT CHANGE UP (ref 150–400)
POTASSIUM SERPL-MCNC: 4.2 MMOL/L — SIGNIFICANT CHANGE UP (ref 3.5–5.3)
POTASSIUM SERPL-SCNC: 4.2 MMOL/L — SIGNIFICANT CHANGE UP (ref 3.5–5.3)
PROT SERPL-MCNC: 7 G/DL — SIGNIFICANT CHANGE UP (ref 6–8.3)
PROT UR-MCNC: SIGNIFICANT CHANGE UP MG/DL
RBC # BLD: 4.07 M/UL — LOW (ref 4.2–5.8)
RBC # FLD: 14.3 % — SIGNIFICANT CHANGE UP (ref 10.3–14.5)
SODIUM SERPL-SCNC: 138 MMOL/L — SIGNIFICANT CHANGE UP (ref 135–145)
SP GR SPEC: 1.03 — SIGNIFICANT CHANGE UP (ref 1–1.03)
UROBILINOGEN FLD QL: 0.2 MG/DL — SIGNIFICANT CHANGE UP (ref 0.2–1)
WBC # BLD: 6.52 K/UL — SIGNIFICANT CHANGE UP (ref 3.8–10.5)
WBC # FLD AUTO: 6.52 K/UL — SIGNIFICANT CHANGE UP (ref 3.8–10.5)

## 2025-02-10 PROCEDURE — 74177 CT ABD & PELVIS W/CONTRAST: CPT | Mod: 26

## 2025-02-10 PROCEDURE — 99285 EMERGENCY DEPT VISIT HI MDM: CPT

## 2025-02-10 NOTE — ED ADULT NURSE REASSESSMENT NOTE - NS ED NURSE REASSESS COMMENT FT1
Patient tolerated PO intake no vomiting noted, discharge instructions given, discharged walking with wife.

## 2025-02-10 NOTE — ED ADULT NURSE NOTE - OBJECTIVE STATEMENT
Pt received from triage on stretcher, A/Ox4 answers all questions appropriately. C/O Lower abdominal pain x 2 days. Pt is s/p colonoscopy x 2 days ago on 2/8. States he has not had a bowel movement since before colonoscopy. Endorses passing gas. Abdomen is soft and non-distended, non-tender to touch. Pt denies N/V, Fever/chills, dizziness. Pt denies chest pain and SOB during initial assessment, breathing is even and unlabored on room air. Pt ambulates independently at baseline, moves all four extremities on command, skin is intact. Pt resting comfortably at the bedside, no apparent acute visible distress noted on initial assessment. Vital signs stable, NKA to medications. PMHx HTN HLD CAD. 20g peripheral IV placed on left wrist, all lab studies obtained and sent. Pending CT imaging

## 2025-02-10 NOTE — ED PROVIDER NOTE - NSDCPRINTRESULTS_ED_ALL_ED
Chief complaint:   Chief Complaint   Patient presents with   • Follow-up   • Device Check     Vitals:  Visit Vitals  /72   Pulse 87   Ht 5' 8\" (1.727 m)   SpO2 97%   BMI 21.55 kg/m²       HISTORY OF PRESENT ILLNESS     HPI   Pt states that he is feeling well today.     BiV PPM, Medtronic: Denies having any pain or discomfort around device incision site.   Atypical atrial flutter: Denies having any palpitations.   Nonischemic cardiomyopathy: Pt reports feeling short of breath, but he notes that this is more likely related to his COPD. He notes that his shortness of breath has not worsened.   Complete heart block (s/p AV node ablation): Denies having any lightheadedness, dizziness, or syncope.   CHF: Denies having any lower extremity swelling or fatigue.  Coumadin anticoagulation: Denies having any bleeding issues.     Other significant problems:  Patient Active Problem List    Diagnosis Date Noted   • Mitral valve disorders 10/27/2011     Priority: High     S/p MV ring repair     • Atrial flutter - atypical 10/27/2011     Priority: High     S/p failed MAZE procedure 11/18/2011.  On coreg, warfarin.  Myocardial perf. scan: 11/13/2011 - LVEF 52%, WNL.  Cardiac cath: 11/15/2011 - WNL.      CHADS VASc Score    Congestive heart failure: 1 (YES: 1, NO: 0)  Hypertension: 0 (YES: 1, NO: 0)  Age: 2 (<65: 0, 65-74: 1, >75: 2)  Diabetes mellitus: 0 (YES: 1, NO: 0)  Stroke/TIA (transient ischemic attack)/thromboembolism: 0 (YES: 2, NO: 0)  Vascular disease: 0 (YES: 1, NO: 0)  Sex: 0 (Male: 0, Female: 1)    Total Score: 3    CHADSVASC clinical risk estimation. Adapted from Kira et al.   [id number]   SCORE ADJUSTED STROKE RATE (% year)   0 0%   1 1,3%   2 2,2%   3 3,2%   4 4,0%   5 6,7%   6 9,8%   7 9,6%   8 6,7%   9 15,2%          • HAWKINS (dyspnea on exertion) 07/21/2016     Priority: Medium   • Chronic systolic congestive heart failure (CMS/HCC) 11/29/2012     Priority: Medium   • Warfarin anticoagulation 07/24/2012      Priority: Medium     Followed by Dr. Mcpherson.   History of AFL.      • Permanent atrial fibrillation 03/04/2020     Priority: Low   • Therapeutic drug monitoring 03/04/2020     Priority: Low   • Herpes zoster without complication 07/19/2019     Priority: Low   • COPD, severe (CMS/HCC) 11/20/2017     Priority: Low   • Wedge compression fracture of sixth thoracic vertebra with delayed healing 04/05/2016     Priority: Low   • Acute diarrhea 08/28/2015     Priority: Low   • Gout 01/22/2015     Priority: Low   • BPH (benign prostatic hyperplasia) 07/19/2013     Priority: Low   • HLD (hyperlipidemia)      Priority: Low   • Encounter for therapeutic drug monitoring 07/08/2013     Priority: Low   • Fecal incontinence 03/20/2013     Priority: Low   • Complete heart block 08/20/2012     Priority: Low     s/p AVN ablation/ PPM.  PPM dependent.      • Pacemaker, Medtronic, BiV 08/09/2012     Priority: Low     Implanted 8/9/2012.   Has LV epicardial lead.  Atrial port plugged.  PPM dependent.   Nearing RICARDO.   Capture management turned off: 3/14/2018.   Has [facility] for remote follow up.     • Cardiomyopathy, non-ischemic 08/02/2012     Priority: Low     ECHO 5/1/2019: LVEF 57%, HAROON 36.2ml/m2.   Nuclear stress test 5/2/2019: normal.   ECHO: 1/7/2016 - LVEF 65%, HAROON 36.2 ml.   ECHO: 5/16/2013 - LVEF 55%, moderate LAE, mild- moderate  TR, mildly dilated IVC.   ECHO: 7/2/2012 - LVEF 36%, mild LAE.        • Hypervolemia 11/21/2011     Priority: Low   • S/P mitral valve repair 11/18/2011     Priority: Low     MV ring/ MAZE procedure 11/8/2011.          PAST MEDICAL, FAMILY AND SOCIAL HISTORY     Medications:  Current Outpatient Medications   Medication   • warfarin (JANTOVEN) 5 MG tablet   • furosemide (LASIX) 40 MG tablet   • allopurinol (ZYLOPRIM) 100 MG tablet   • carvedilol (COREG) 3.125 MG tablet   • tiZANidine (ZANAFLEX) 2 MG tablet   • ADVAIR DISKUS 250-50 MCG/DOSE inhaler   • finasteride (PROSCAR) 5 MG tablet   • simvastatin  (ZOCOR) 20 MG tablet   • MITIGARE 0.6 MG capsule   • albuterol-ipratropium 2.5 mg/0.5 mg (DUONEB) 0.5-2.5 (3) MG/3ML nebulizer solution   • acetaminophen (TYLENOL) 500 MG tablet   • Nebulizer Misc   • Respiratory Therapy Supplies (NEBULIZER COMPRESSOR) Kit   • albuterol-ipratropium (COMBIVENT RESPIMAT) 100-20 MCG/ACT inhaler   • latanoprost (XALATAN) 0.005 % ophthalmic solution     No current facility-administered medications for this visit.        Allergies:  ALLERGIES:   Allergen Reactions   • Enalapril SHORTNESS OF BREATH   • Tramadol DIZZINESS   • Azithromycin RASH       Past Medical  History/Surgeries:  Past Medical History:   Diagnosis Date   • Arthritis    • Atrial flutter (CMS/[facility]) 08/00/2012   • Bacteremia    • Bacterial endocarditis    • BPH (benign prostatic hyperplasia) 7/19/2013   • Cardiac failure congestive    • Cardiomyopathy (CMS/[facility]) 7/2/2012    echo  EF 36%   • Cataracts, bilateral    • COPD, severe (CMS/[facility]) 10/9/2017   • Diverticulosis    • Essential (primary) hypertension    • Glaucoma    • Gout 1/22/2015   • Herpes zoster without complication 7/19/2019   • HLD (hyperlipidemia)    • LV dysfunction    • Mitral regurgitation    • Mitral valve disease    • Paroxysmal A-fib (CMS/[facility])    • Recurrent Atrial fibrillation    • Tinnitus    • Wedge compression fracture of sixth thoracic vertebra with delayed healing 4/5/2016       Past Surgical History:   Procedure Laterality Date   • Atrial ablation surgery  08/00/2012   • Breast cyst excision  1945    nipple removal benign   • Cardiac catherization  11/15/11    pacemaker   • Cardiac surgery      mitral valve, cabg   • Cataract extraction w/  intraocular lens implant  06/00/2004    right   • Colonoscopy  01/00/2003   • Coronary artery bypass graft  11/8/2011    singe vessel   • Echo heart resting  05/16/2013    LVEF 55%   • Eye surgery      rt cat   • Glaucoma surgery  09/00/2010    right eye   • Knee cartilage surgery      left knee   • Mitral valve  repair  2012    AVN   • Pacemaker implant  2012    Medtronic BiV   • Ptca     • Rev upper eyelid w excess skin      Blepharoplasty   • Vasectomy         Family History:  Family History   Problem Relation Age of Onset   • Heart Father    • Heart disease Father    • Heart Brother    • Heart disease Brother        Social History:  Social History     Tobacco Use   • Smoking status: Former Smoker     Packs/day: 2.50     Years: 20.00     Pack years: 50.00     Types: Cigarettes     Start date: 1948     Last attempt to quit: 10/27/1961     Years since quittin.4   • Smokeless tobacco: Former User     Types: Chew     Quit date: 1951   • 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    PHYSICAL EXAM     Physical Exam  Constitutional:       General: He is not in acute distress.     Appearance: He is well-developed. He is not diaphoretic.   HENT:      Head: Normocephalic and atraumatic.   Eyes:      General: No scleral icterus.        Right eye: No discharge.         Left eye: No discharge.      Conjunctiva/sclera: Conjunctivae normal.   Neck:      Musculoskeletal: Normal range of motion and neck supple.   Cardiovascular:      Rate and Rhythm: Normal rate.      Comments: Device incision well healed  Pulmonary:      Effort: Pulmonary effort is normal. No respiratory distress.      Breath sounds: No stridor.   Abdominal:      General: There is no distension.   Skin:     General: Skin is warm and dry.      Coloration: Skin is not pale.      Findings: No erythema or rash.   Neurological:      Mental Status: He is alert and  oriented to person, place, and time.      Coordination: Coordination normal.   Psychiatric:         Behavior: Behavior normal.         Thought Content: Thought content normal.         Judgment: Judgment normal.       Sodium (mmol/L)   Date Value   12/05/2019 138     Potassium (mmol/L)   Date Value   12/05/2019 4.4     Chloride (mmol/L)   Date Value   12/05/2019 104     Glucose (mg/dL)   Date Value   12/05/2019 78     CALCIUM (mg/dL)   Date Value   12/05/2019 8.8     Carbon Dioxide (mmol/L)   Date Value   12/05/2019 27     BUN (mg/dL)   Date Value   12/05/2019 31 (H)     Creatinine (mg/dL)   Date Value   12/05/2019 0.99     GFR Estimate, Non  (no units)   Date Value   12/05/2019 67     WBC (K/mcL)   Date Value   12/05/2019 6.9     RBC (mil/mcL)   Date Value   12/05/2019 3.79 (L)     HCT (%)   Date Value   12/05/2019 37.3 (L)     HGB (g/dL)   Date Value   12/05/2019 12.1 (L)     PLT (K/mcL)   Date Value   12/05/2019 166   01/15/2013 168     INR (no units)   Date Value   03/03/2020 2.2   01/02/2020 2.2       ASSESSMENT/PLAN     Complete heart block  S/p AV node ablation.    Atrial flutter - atypical  S/p failed MAZE procedure on 11/18/2011.  Asymptomatic.  On coumadin.    Pacemaker, Medtronic, BiV  I personally reviewed the device interrogation/programming as recorded with this encounter and agree with the documentation.   Estimated battery longevity is 8 months  Normal device programming.  3 NSVT, 5-11 beats  99.1% .  Pt is advised to send remote check in 3 months and return to the office in 6 months      Warfarin anticoagulation  Target INR between 2 and 3. The patient reports no bleeding.      Chronic systolic congestive heart failure  Clinically, heart failure is well compensated without evidence of significant volume overload. On device interrogation, OptiVol is subthreshold, reflective of an optimal fluid status. Continue present management.      Cardiomyopathy, non-ischemic  Ejection Fraction    Date Value Ref Range Status   05/01/2019 57.0 % Final     Return in about 6 months (around 9/11/2020), or if symptoms worsen or fail to improve, for remote check in 3 months.     On 3/11/2020, Cindy HARRISON scribed the services personally performed by Jose Roberto Mg MD.       The documentation recorded by the scribe accurately and completely reflects the service(s) I personally performed and the decisions made by me.        Patient requests all Lab, Cardiology, and Radiology Results on their Discharge Instructions

## 2025-02-10 NOTE — ED PROVIDER NOTE - NSFOLLOWUPINSTRUCTIONS_ED_ALL_ED_FT
You have been evaluated in the Emergency Department today for abdominal pain. Your evaluation did not show evidence of medical conditions requiring emergent intervention at this time. The results of your labs and imaging are included in your discharge instructions. Bring this paperwork with you to all follow up appointments.    Please schedule an appointment with your primary care physician this week. Additionally, discuss your visit today with your gastroenterologist (GI) doctor, and schedule a follow up appointment with them as soon as you can.    Continue taking all medications as prescribed.    Return to the Emergency Department if you experience worsening or uncontrolled pain, fevers 100.4°F or greater, recurrent vomiting, inability to tolerate food or fluids by mouth, bloody stools or vomit, black or tarry stools, not able to have a bowel movement, not passing gas, or any other new, worsening or concerning symptoms.

## 2025-02-10 NOTE — CONSULT NOTE ADULT - ASSESSMENT
79yM PMHx HTN, CAD/stent (ASA), hernia repair in remote past presenting with lower abdominal discomfort s/p recent colonoscopy with polypectomy. CTAP showing no bowel inflammation/free air to suggest bowel perforation, but abnormal course of the duodenojejunal junction now situated to the right of midline c/f possible intestinal malrotation. Surgery consulted to assess.     Rec:  - no acute surgical intervention, patient not clinically obstructed with benign abd exam, labs unremarkable and CT showing no evidence of perf, abnormal bowel dilatation, or mesenteric swirling  - patient can f/u with his OP GI  - PO challenge and dc per ED  - Discussed with attending      [facility] [id number]  Elier Infante MD (PGY2)

## 2025-02-10 NOTE — ED PROVIDER NOTE - PATIENT PORTAL LINK FT
30 You can access the FollowMyHealth Patient Portal offered by [facility] by registering at the following website: [email or web address]. By joining [employer]’s FollowMyHealth portal, you will also be able to view your health information using other applications (apps) compatible with our system.

## 2025-02-10 NOTE — ED ADULT TRIAGE NOTE - CHIEF COMPLAINT QUOTE
Pt arrives to ED s/p colonoscopy on Saturday.  and reports lower abdominal pain starting 17:00 on Sunday.  Pt denies any bleeding..  Hx: HTN, cardiac stent x1 in 2010.  Pt takes ASA 81mg.

## 2025-02-10 NOTE — ED ADULT NURSE REASSESSMENT NOTE - NS ED NURSE REASSESS COMMENT FT1
break coverage RN: pt A&Ox4 RR even unlabored completing full clear sentences. pt resting in stretcher comfortably at this time, offers no new complaints. NAD noted on assessment. stretcher lowest position siderails up safety measures in place. pt pending CTscan at this time

## 2025-02-10 NOTE — ED PROVIDER NOTE - OBJECTIVE STATEMENT
79yM PMH HTN, CAD s/p 1 stent on aspirin presenting with lower abdominal discomfort. he had a routine colonoscopy 2 days ago. 2 polyps were removed and he was sent home. yesterday evening around 1700 he started feeling lower abdominal discomfort which is non radiating. took Tylenol prior to coming around 2300 which he states helped a little. he has not had a BM since the colonoscopy but is passing gas. he has no nausea, vomiting, diarrhea. no dysuria or hematuria. no fevers/chills, shortness of breath, chest pain. only abdominal surgery was hernia repair.

## 2025-02-10 NOTE — ED PROVIDER NOTE - PROGRESS NOTE DETAILS
PGY-2/Flavio DO: Patient signed out to me at shift change. 80 yo M h/o inguinal hernia, colonoscopy x2 day ago, started having lower abdominal pain yesterday and has not had BM since. CT showing possible concern for malrotation, recommending surgical cs to r/o internal hernia; no SBO. Surgery has been consulted by previous team, awaiting their eval and recs to dispo. EM PGY-2/Flavio DO: Informed by RN that pt wanting to go home. Pt seen and examined at bedside, sitting upright in NAD. Sts was seen by surgery but unsure of what the plan was. Discussed with surgery, sts no concerns regarding CT findings, recommend PO challenge and close f/u with pt's GI, along with strict return precautions. Will dispo upon tolerating PO. Pt sts feels comfortable with this plan and works with his GI dr, so will discuss ED visit with them ASAP and attempt to get appointment sooner than routine scheduled f/u next week. EM PGY-2/Flavio, DO: tolerated PO. I have discussed all results, discharge instructions, strict return precautions and need for follow up with patient. They have verbalized understanding and agreement to plan at this time. Amenable to discharge.

## 2025-02-10 NOTE — ED ADULT NURSE NOTE - NSFALLUNIVINTERV_ED_ALL_ED
Bed/Stretcher in lowest position, wheels locked, appropriate side rails in place/Call bell, personal items and telephone in reach/Instruct patient to call for assistance before getting out of bed/chair/stretcher/Non-slip footwear applied when patient is off stretcher/[place] to call system/Physically safe environment - no spills, clutter or unnecessary equipment/Purposeful proactive rounding/Room/bathroom lighting operational, light cord in reach

## 2025-02-10 NOTE — CONSULT NOTE ADULT - SUBJECTIVE AND OBJECTIVE BOX
*** SURGERY CONSULT NOTE    Consulting Team: [facility]     Patient: GEORGETTE BERMUDEZ , 79y ([birth year])Male   MRN: [id number]  Location: [facility] ED  Visit: 02-10-25 Emergency  Date: 02-10-25 @ 08:27      HPI: 79yM PMHx HTN, CAD/stent (ASA), hernia repair in remote past presenting with lower abdominal discomfort. Patient underwent routine colonoscopy 2 days ago with removal of x2 TC polyps. Patient subsequently developed lower abdominal discomfort later that evening, he otherwise denies subjective f/c, n/v, CP, SOB, or change in bowel function (+/-). In the ED AVSS, labs unremarkable, and CTAP showing no bowel inflammation/free air to suggest bowel perforation, but abnormal course of the duodenojejunal junction now situated to the right   of midline c/f possible intestinal malrotation. Surgery consulted to assess.       PAST MEDICAL HISTORY:  Hypertension    CAD (coronary artery disease)    History of BPH        PAST SURGICAL HISTORY:  No significant past surgical history        MEDICATIONS:      ALLERGIES:  No Known Allergies      VITALS & I/Os:  Vital Signs Last 24 Hrs  T(C): 36.7 (10 Feb 2025 06:09), Max: 36.9 (10 Feb 2025 00:32)  T(F): 98 (10 Feb 2025 06:09), Max: 98.4 (10 Feb 2025 00:32)  HR: 74 (10 Feb 2025 06:09) (67 - 80)  BP: 140/88 (10 Feb 2025 06:09) (122/78 - 161/89)  BP(mean): 101 (10 Feb 2025 06:09) (90 - 101)  RR: 18 (10 Feb 2025 06:09) (16 - 18)  SpO2: 96% (10 Feb 2025 06:09) (96% - 99%)    Parameters below as of 10 Feb 2025 06:09  Patient On (Oxygen Delivery Method): room air        I&O's Summary      PHYSICAL EXAM:  General Appearance: no acute distress, NTND   Chest: airway intact, non-labored breathing  CV: no JVD, palpable pulses b/l  Abdomen: soft, non-tender, non-distended  Extremities: WWP     LABS:                        12.6   6.52  )-----------( 151      ( 10 Feb 2025 02:00 )             36.8     02-10    138  |  105  |  18  ----------------------------<  110[H]  4.2   |  23  |  1.31[H]    Ca    8.9      10 Feb 2025 02:00    TPro  7.0  /  Alb  3.9  /  TBili  0.4  /  DBili  x   /  AST  16  /  ALT  11  /  AlkPhos  90  02-10    Lactate:  02-10 @ 02:00  1.0              Urinalysis Basic - ( 10 Feb 2025 02:00 )    Color: x / Appearance: x / SG: x / pH: x  Gluc: 110 mg/dL / Ketone: x  / Bili: x / Urobili: x   Blood: x / Protein: x / Nitrite: x   Leuk Esterase: x / RBC: x / WBC x   Sq Epi: x / Non Sq Epi: x / Bacteria: x          IMAGING:  FINDINGS:  LOWER CHEST: Cardiomegaly. Coronary artery calcifications. Bibasilar   subsegmental atelectasis and mild bronchiectasis.    LIVER: Subcentimeter hypodense foci, which are too small to characterize.  BILE DUCTS: Normal caliber.  GALLBLADDER: Within normal limits.  SPLEEN: Subcentimeter hypodense foci, which are too small to characterize.  PANCREAS: Within normal limits.  ADRENALS: Within normal limits.  KIDNEYS/URETERS: No hydronephrosis. Right renal cyst and bilateral   subcentimeter hypodense foci, which are too small to characterize.    BLADDER: Within normal limits.  REPRODUCTIVE ORGANS: Prostate is enlarged, with protrusion into the base   of the bladder.    BOWEL: Abnormal course of the duodenal jejunal junction now coursing to   the right of midline with jejunal loops predominantly on the right side   and ileal loops on the left side, although no mesenteric swirling or   abnormal bowel dilatation. No bowel obstruction. Colonoscopy clip at the   hepatic flexure. Appendix is not visualized. No evidence of inflammation   in the pericecal region.  PERITONEUM/RETROPERITONEUM: No pneumoperitoneum or ascites.  VESSELS: Atherosclerotic changes. Ectatic infrarenal abdominal aorta   measuring up to 2.8 cm. Moderate to severe narrowing at the celiac artery   origin, suggesting median arcuate ligament compression.  LYMPH NODES: No lymphadenopathy.  ABDOMINAL WALL: Small fat-containing umbilical hernia.  BONES: Degenerative changes. Grade 1 anterolisthesis of L3 on L4.    IMPRESSION:  No bowel inflammation or free air to suggest bowel perforation.    Abnormal course of the duodenojejunal junction now situated to the right   of midline as would be seen in intestinal malrotation, new since 2023,   without abnormal bowel dilatation or mesenteric swirling. Findings are of   uncertain clinical significance. Correlate with recent surgical history.   In the presence of abdominal pain, recommend surgical consult to exclude   internal hernia, although no bowel obstruction present.

## 2025-02-10 NOTE — ED ADULT NURSE REASSESSMENT NOTE - NS ED NURSE REASSESS COMMENT FT1
Patient received into my care from Rajesh ALCARAZ night shift, alert and oriented x 4. Requesting to leave and will just follow up with PCP. ED MD informed, PO challenged started and awaiting discharged instructions at this time. no

## 2025-02-10 NOTE — ED PROVIDER NOTE - ATTENDING CONTRIBUTION TO CARE
I have personally performed a face to face medical and diagnostic evaluation of the patient. I have discussed with and reviewed the Resident's note and agree with the History, ROS, Physical Exam and MDM unless otherwise indicated. A brief summary of my personal evaluation and impression can be found below.    79-year-old male, past medical history as detailed above presenting with chief complaint of lower abdominal pain that started on Sunday, patient had a colonoscopy on Saturday.  States that he was pain-free for about 1 day.  Does not endorse any associated nausea, vomiting, diarrhea, rectal bleeding.  Has not had a bowel movement since prior to the colonoscopy.  But able to pass gas.  History of hernia repair in the past.  On exam, vital signs stable, mild reproducible suprapubic tenderness to palpation.  Rest of abdomen is soft nontender.  Patient overall well-appearing, but given mild tenderness and history of surgery and recent colonoscopy, plan for labs and  CT of the abdomen pelvis to assess for possible obstruction versus perf.   Reassess to dispo. Ritesh Granda, ED Attending

## 2025-02-10 NOTE — ED PROVIDER NOTE - CLINICAL SUMMARY MEDICAL DECISION MAKING FREE TEXT BOX
79yM PMH HTN, CAD s/p 1 stent on ASA, BPH presenting with suprapubic abdominal discomfort. had colonoscopy 2 days ago without complications. developed abdominal discomfort yesterday evening. mildly tender to palpation on exam but otherwise well appearing. will obtain labs, ua/uc to r/o urinary tract infection and ct abdomen pelvis to evaluate for bowel obstruction vs perforation. vitals are stable and he is afebrile.

## 2025-02-10 NOTE — ED ADULT TRIAGE NOTE - HEIGHT IN INCHES
11 [Dry Mouth] : dry mouth [As Noted in HPI] : as noted in HPI [Heartburn] : heartburn [Reflux] : reflux [Myalgias] : myalgias [Arthralgias] : arthralgias [Raynaud] : Raynaud's phenomenon was observed [Scleroderma] : scleroderma [Negative] : Sleep Disorder [Fever] : no fever [Postnasal Drip] : no postnasal drip [Sinus Problems] : no sinus problems [Mouth Ulcers] : no mouth ulcers [Cough] : no cough [PND] : no PND [Orthopnea] : no orthopnea [Edema] : ~T edema was not present